# Patient Record
Sex: MALE | Race: BLACK OR AFRICAN AMERICAN
[De-identification: names, ages, dates, MRNs, and addresses within clinical notes are randomized per-mention and may not be internally consistent; named-entity substitution may affect disease eponyms.]

---

## 2021-11-18 ENCOUNTER — HOSPITAL ENCOUNTER (OUTPATIENT)
Dept: HOSPITAL 61 - KCIC MRI | Age: 51
End: 2021-11-18
Attending: PREVENTIVE MEDICINE
Payer: COMMERCIAL

## 2021-11-18 DIAGNOSIS — S83.242A: Primary | ICD-10-CM

## 2021-11-18 DIAGNOSIS — M25.462: ICD-10-CM
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
## 2021-11-18 DIAGNOSIS — M65.88: ICD-10-CM
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
## 2021-11-18 PROCEDURE — 70030 X-RAY EYE FOR FOREIGN BODY: CPT

## 2021-11-18 PROCEDURE — 73721 MRI JNT OF LWR EXTRE W/O DYE: CPT

## 2021-11-18 NOTE — [facility]
STUDY: MRI of the left knee without contrast



INDICATION: Left knee pain. Injury reported on 11/3/2021. 



COMPARISON: None.



TECHNIQUE: Multiplanar MR imaging of the left knee performed without the use of intravenous or intra-
articular contrast.



FINDINGS:



Menisci: Predominantly free edge tearing along the medial meniscus body and posterior horn but likely
 with some extension of tearing into the meniscal substance such as at the posterior body/horn juncti
on on image 10 series 11 and image 22 series 9. The medial meniscal body is extruded from the joint l
ine by approximately 4 mm. Root insertional fibers remain visualized. No discrete tear of the lateral
 meniscus.



Cruciate ligaments: Intact.



Collateral ligaments: Edema along the medial collateral ligament complex. No focal ligament tear. The
 lateral collateral ligaments are intact. No retinacular disruption. 



Tendons: No tendon tear or advanced tendinosis.



Cartilage:



Patellofemoral: Superficial patellar chondrosis. Areas of high-grade and partly full-thickness chondr
osis along the trochlea greatest at the inferior aspect of the lateral trochlea. A few areas of troch
lear fissuring and delamination noted as well.

Lateral compartment: Relatively mild scattered chondrosis to include both the weightbearing and poste
rior nonweightbearing aspects of the joint.

Medial compartment: High-grade chondral loss at the weightbearing aspect of the joint with joint spac
e narrowing. 



Bones: Subchondral cystic change at the lower margin of the lateral trochlea. Mild marrow edema at th
e periphery of the medial tibial plateau. No acute fracture.



Miscellaneous: Large knee joint effusion with synovitis. Small amount of complex fluid extends to the
 joint along the popliteus tendon. No localized soft tissue edema along the medial aspect of the prox
imal tibia, image 26 series 6. 



IMPRESSION:



1.  Predominantly free edge tearing of the medial meniscus body and posterior horn but with some exte
nsion into the substance of the meniscus. Extruded medial meniscal body from the joint line but appea
ring secondary to chondral loss/joint space narrowing as there is no complete avulsion from the root 
insertions. The lateral meniscus is intact as are the cruciate ligaments.

2.  Soft tissue edema at the medial knee some of which is along the MCL complex but favored reactive 
and there is no high-grade ligamentous tear. The lateral collateral ligaments are intact. No acute te
ndon injury.

3.  Marrow edema at the periphery of the medial tibial plateau could be reactive/degenerative or seco
ndary to mild contusion. No fracture.

4.  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
 the trochlea. Associated trochlear subchondral cystic change.

5.  Large knee joint effusion with synovitis.



Electronically signed by: GIANCARLO PACHECO MD (11/18/2021 2:32 PM) [id number]

## 2021-11-18 NOTE — [facility]
EXAM: XR EYE_DETECT FOREIGN BODY.



HISTORY: Screening prior to MRI.



COMPARISON: None.



FINDINGS: There is a metallic mesh repair of the left inferior orbital floor. No acute fractures are 
identified. The paranasal sinuses are clear.



IMPRESSION: 

1. Metallic mesh repair of left inferior orbital floor. Correlate to ensure MRI compatibility.



Electronically signed by: KASANDRA Burroughs MD (11/18/2021 4:19 PM) [facility]